# Patient Record
Sex: MALE | Race: WHITE | NOT HISPANIC OR LATINO | Employment: UNEMPLOYED | URBAN - METROPOLITAN AREA
[De-identification: names, ages, dates, MRNs, and addresses within clinical notes are randomized per-mention and may not be internally consistent; named-entity substitution may affect disease eponyms.]

---

## 2017-03-20 ENCOUNTER — HOSPITAL ENCOUNTER (EMERGENCY)
Facility: HOSPITAL | Age: 47
Discharge: HOME | End: 2017-03-20
Attending: EMERGENCY MEDICINE
Payer: MEDICAID

## 2017-03-20 VITALS
HEART RATE: 68 BPM | BODY MASS INDEX: 23.49 KG/M2 | DIASTOLIC BLOOD PRESSURE: 70 MMHG | TEMPERATURE: 98 F | HEIGHT: 68 IN | SYSTOLIC BLOOD PRESSURE: 121 MMHG | OXYGEN SATURATION: 98 % | WEIGHT: 155 LBS | RESPIRATION RATE: 18 BRPM

## 2017-03-20 DIAGNOSIS — R07.81 RIB PAIN ON LEFT SIDE: ICD-10-CM

## 2017-03-20 DIAGNOSIS — S40.012A CONTUSION OF LEFT SHOULDER, INITIAL ENCOUNTER: Primary | ICD-10-CM

## 2017-03-20 DIAGNOSIS — M50.30 BULGING OF CERVICAL INTERVERTEBRAL DISC: ICD-10-CM

## 2017-03-20 PROCEDURE — 99283 EMERGENCY DEPT VISIT LOW MDM: CPT

## 2017-03-20 PROCEDURE — 25000003 PHARM REV CODE 250: Performed by: EMERGENCY MEDICINE

## 2017-03-20 RX ORDER — NAPROXEN 500 MG/1
500 TABLET ORAL 2 TIMES DAILY WITH MEALS
Qty: 10 TABLET | Refills: 0 | Status: SHIPPED | OUTPATIENT
Start: 2017-03-20 | End: 2020-11-11

## 2017-03-20 RX ORDER — ACETAMINOPHEN 500 MG
1000 TABLET ORAL
Status: COMPLETED | OUTPATIENT
Start: 2017-03-20 | End: 2017-03-20

## 2017-03-20 RX ADMIN — ACETAMINOPHEN 1000 MG: 500 TABLET ORAL at 11:03

## 2017-03-20 NOTE — ED AVS SNAPSHOT
[facility]  [address] [facility] [address]               Jose Arriola   3/20/2017 10:07 PM   ED    Description:  Male : 1970   Department:  [facility]            Your Care was Coordinated By:     Provider Role From To    Lee Childs MD Attending Provider 17 1740 --      Reason for Visit     Fall           Diagnoses this Visit        Comments    Contusion of left shoulder, initial encounter    -  Primary     Rib pain on left side         Bulging of cervical intervertebral disc           ED Disposition     None           To Do List            These Medications        Disp Refills Start End    naproxen (NAPROSYN) 500 MG tablet 10 tablet 0 3/20/2017     Take 1 tablet (500 mg total) by mouth 2 (two) times daily with meals. - Oral      [facility]     [facility] On Call Nurse Care Line -  Assistance  Registered nurses in the [facility] provide clinical advisement, health education, appointment booking, and other advisory services.  Call for this free service at [phone].             Medications           Message regarding Medications     Verify the changes and/or additions to your medication regime listed below are the same as discussed with your clinician today.  If any of these changes or additions are incorrect, please notify your healthcare provider.        START taking these NEW medications        Refills    naproxen (NAPROSYN) 500 MG tablet 0    Sig: Take 1 tablet (500 mg total) by mouth 2 (two) times daily with meals.    Class: Print    Route: Oral      These medications were administered today        Dose Freq    acetaminophen tablet 1,000 mg 1,000 mg ED 1 Time    Sig: Take 2 tablets (1,000 mg total) by mouth ED 1 Time.    Class: Normal    Route: Oral           Verify that the below list of medications is an accurate representation of the medications you are currently taking.  If none  "reported, the list may be blank. If incorrect, please contact your healthcare provider. Carry this list with you in case of emergency.           Current Medications     acetaminophen tablet 1,000 mg Take 2 tablets (1,000 mg total) by mouth ED 1 Time.    naproxen (NAPROSYN) 500 MG tablet Take 1 tablet (500 mg total) by mouth 2 (two) times daily with meals.           Clinical Reference Information           Your Vitals Were     BP Pulse Temp Resp Height Weight    126/77 (BP Location: Right arm, Patient Position: Sitting) 75 98.4 °F (36.9 °C) (Oral) 18 5' 8" (1.727 m) 70.3 kg (155 lb)    SpO2 BMI             96% 23.57 kg/m2         Allergies as of 3/20/2017     No Known Allergies      Immunizations Administered on Date of Encounter - 3/20/2017     None      ED Micro, Lab, POCT     None      ED Imaging Orders     Start Ordered       Status Ordering Provider    03/20/17 2217 03/20/17 2217  X-Ray Shoulder Trauma 3 view Left  1 time imaging      Final result     03/20/17 2132 03/20/17 2132  X-Ray Chest PA And Lateral  1 time imaging      Final result     03/20/17 2132 03/20/17 2132  X-Ray Neck Soft Tissue  1 time imaging      Final result         Discharge Instructions         Bruises (Contusions)    A contusion is a bruise. A bruise happens when a blow to your body doesn't break the skin but does break blood vessels beneath the skin. Blood leaking from the broken vessels causes redness and swelling. As it heals, your bruise is likely to turn colors like purple, green, and yellow. This is normal. The bruise should fade in 2 or 3 weeks.  Factors that make you more likely to bruise  Almost everyone bruises now and then. Certain people do bruise more easily than others. You're more prone to bruising as you get older. That's because blood vessels become more fragile with age. You're also more likely to bruise if you have a clotting disorder such as hemophilia or take medications that reduce clotting, including aspirin.  When " to go to the emergency room (ER)  Bruises almost always heal on their own without special treatment. But for some people, a bad bruise can be serious. Seek medical care if you:  · Have a clotting disorder such as hemophilia.  · Have cirrhosis or other serious liver disease.  · Take blood-thinning medications such as warfarin (Coumadin).  What to expect in the ER  A doctor will examine your bruise and ask about any health conditions you have. In some cases, you may have a test to check how well your blood clots. Other treatment will depend on your needs.  Follow-up care  Sometimes a bruise gets worse instead of better. It may become larger and more swollen. This can occur when your body walls off a small pool of blood under the skin (hematoma). In very rare cases, your doctor may need to drain excess blood from the area.  Tip:  Apply an ice pack or bag of frozen peas to a bruise (keep a thin cloth between the cold source and your skin). This can help reduce redness and swelling.   Date Last Reviewed: 11/30/2014  © 4441-6722 [employer]. [address] [place] [address]. All rights reserved. This information is not intended as a substitute for professional medical care. Always follow your healthcare professional's instructions.          [facility] Sign-Up     Activating your [facility] account is as easy as 1-2-3!     1) Visit [email or web address], select Sign Up Now, enter this activation code and your date of birth, then select Next.  [email or web address]  Expires: 5/4/2017 11:03 PM      2) Create a username and password to use when you visit [facility] in the future and select a security question in case you lose your password and select Next.    3) Enter your e-mail address and click Sign Up!    Additional Information  If you have questions, please e-mail [email or web address] or call [phone] to talk to our [facility] staff. Remember, [facility] is NOT to be used for urgent needs. For medical  emergencies, dial 911.         Smoking Cessation     If you would like to quit smoking:   You may be eligible for free services if you are a [place] resident and started smoking cigarettes before September 1, 1988.  Call the Smoking Cessation Trust (SCT) toll free at (846) 160-9579 or (028) 456-7551.   Call 1-800-QUIT-NOW if you do not meet the above criteria.             [facility] complies with applicable Federal civil rights laws and does not discriminate on the basis of race, color, national origin, age, disability, or sex.        Language Assistance Services     ATTENTION: Language assistance services are available, free of charge. Please call [phone].      ATENCIÓN: Si habla español, tiene a peralta disposición servicios gratuitos de asistencia lingüística. Llame al [phone].     CHÚ Ý: N?u b?n nói Ti?ng Vi?t, có các d?ch v? h? tr? ngôn ng? mi?n phí dành cho b?n. G?i s? [phone].

## 2017-03-21 NOTE — ED NOTES
Started cursing after I gave tylenol refused to take stating will not help and just had taken neurotin. Cursing loudly at this time and walked out of Ed

## 2017-03-21 NOTE — DISCHARGE INSTRUCTIONS
Bruises (Contusions)    A contusion is a bruise. A bruise happens when a blow to your body doesn't break the skin but does break blood vessels beneath the skin. Blood leaking from the broken vessels causes redness and swelling. As it heals, your bruise is likely to turn colors like purple, green, and yellow. This is normal. The bruise should fade in 2 or 3 weeks.  Factors that make you more likely to bruise  Almost everyone bruises now and then. Certain people do bruise more easily than others. You're more prone to bruising as you get older. That's because blood vessels become more fragile with age. You're also more likely to bruise if you have a clotting disorder such as hemophilia or take medications that reduce clotting, including aspirin.  When to go to the emergency room (ER)  Bruises almost always heal on their own without special treatment. But for some people, a bad bruise can be serious. Seek medical care if you:  · Have a clotting disorder such as hemophilia.  · Have cirrhosis or other serious liver disease.  · Take blood-thinning medications such as warfarin (Coumadin).  What to expect in the ER  A doctor will examine your bruise and ask about any health conditions you have. In some cases, you may have a test to check how well your blood clots. Other treatment will depend on your needs.  Follow-up care  Sometimes a bruise gets worse instead of better. It may become larger and more swollen. This can occur when your body walls off a small pool of blood under the skin (hematoma). In very rare cases, your doctor may need to drain excess blood from the area.  Tip:  Apply an ice pack or bag of frozen peas to a bruise (keep a thin cloth between the cold source and your skin). This can help reduce redness and swelling.   Date Last Reviewed: 11/30/2014  © 7855-3888 [employer]. [address] [place] [address], [place] [address]. All rights reserved. This information is not intended as a substitute for  professional medical care. Always follow your healthcare professional's instructions.

## 2017-03-21 NOTE — ED PROVIDER NOTES
SCRIBE #1 NOTE: I, Nick Becerra, am scribing for, and in the presence of, Lee Childs MD. I have scribed the entire note.      History      Chief Complaint   Patient presents with    Fall     pt states he had a fall and it talisha his neck where he has bulging disc       Review of patient's allergies indicates:  No Known Allergies     HPI   HPI    3/20/2017, 10:33 PM   History obtained from the patient      History of Present Illness: Jose Arriola is a 46 y.o. male patient who presents to the Emergency Department for left shoulder pain which onset suddenly 3 hours PTA after jumping a fence and falling. Symptoms are constant and moderate in severity. No mitigating or exacerbating factors reported. Associated sxs include left rib cage pain and left elbow pain. Patient denies any LOC, numbness, weakness, n/v/d, abdominal pain, HA, lightheadedness, dizziness, gait problem, and all other sxs at this time. No further complaints or concerns at this time.         Arrival mode: Personal vehicle     PCP: Primary Doctor No       Past Medical History:  Past medical history reviewed not relevant      Past Surgical History:  Past surgical history reviewed not relevant      Family History:  Family history reviewed not relevant      Social History:  Social History    Social History Main Topics    Social History Main Topics    Smoking status: Unknown if ever smoked    Smokeless tobacco: Unknown if ever used    Alcohol Use: Unknown drinking history    Drug Use: Unknown if ever used    Sexual Activity: Unknown       ROS   Review of Systems   Constitutional: Negative for fever.        - LOC   HENT: Negative for sore throat.    Respiratory: Negative for shortness of breath.    Cardiovascular: Negative for chest pain.   Gastrointestinal: Negative for abdominal pain, diarrhea, nausea and vomiting.   Genitourinary: Negative for dysuria.   Musculoskeletal: Positive for arthralgias (left shoulder and elbow). Negative for  "back pain and gait problem.        + left rib cage pain   Skin: Negative for rash.   Neurological: Negative for dizziness, weakness, light-headedness, numbness and headaches.   Hematological: Does not bruise/bleed easily.       Physical Exam    Initial Vitals   BP Pulse Resp Temp SpO2   03/20/17 2130 03/20/17 2130 03/20/17 2130 03/20/17 2130 03/20/17 2130   126/77 75 18 98.4 °F (36.9 °C) 96 %      Physical Exam  Nursing Notes and Vital Signs Reviewed.  Constitutional: Patient is in no acute distress. Awake and alert. Well-developed and well-nourished.  Head: Atraumatic. Normocephalic.  Eyes: PERRL. EOM intact. Conjunctivae are not pale. No scleral icterus.  ENT: Mucous membranes are moist. Oropharynx is clear and symmetric.    Neck: Supple. Full ROM. No lymphadenopathy.  Cardiovascular: Regular rate. Regular rhythm. No murmurs, rubs, or gallops. Distal pulses are 2+ and symmetric.  Pulmonary/Chest: No respiratory distress. Clear to auscultation bilaterally. No wheezing, rales, or rhonchi. Mild left sided chest wall tenderness. No crepitus.  Abdominal: Soft and non-distended.  There is no tenderness.  No rebound, guarding, or rigidity. Good bowel sounds.  Musculoskeletal: Moves all extremities. No obvious deformities. No edema. No calf tenderness.  LUE: has no evident deformity. negative for swelling. Positive for tenderness to the left shoulder. ROM is decreased secondary to pain. Cap refill distally is <2 seconds. Radial pulses are equal and 2+ bilaterally. No motor deficit. No distal sensory deficit.  Skin: Warm and dry.  Neurological:  Alert, awake, and appropriate.  Normal speech.  No acute focal neurological deficits are appreciated.  Psychiatric: Normal affect. Good eye contact. Appropriate in content.    ED Course    Procedures  ED Vital Signs:  Vitals:    03/20/17 2130   BP: 126/77   Pulse: 75   Resp: 18   Temp: 98.4 °F (36.9 °C)   TempSrc: Oral   SpO2: 96%   Weight: 70.3 kg (155 lb)   Height: 5' 8" (1.727 " m)         Imaging Results:  Imaging Results         X-Ray Shoulder Trauma 3 view Left (Final result) Result time:  03/20/17 22:32:02    Final result by Elías Plascencia MD (03/20/17 22:32:02)    Impression:         Unremarkable exam.      Electronically signed by: ELÍAS PLASCENCIA MD  Date:     03/20/17  Time:    22:32     Narrative:    Exam: Left shoulder x-ray, 3 views.    History: Pain in left shoulder.    Findings: AP views were obtained in internal and external rotation. In addition, a transscapular view was acquired. The AC and glenohumeral joints are unremarkable. No fracture, periosteal reaction, or osseous destruction identified.            X-Ray Chest PA And Lateral (Final result) Result time:  03/20/17 21:52:13    Final result by Tone Griffith MD (03/20/17 21:52:13)    Impression:           1.  Negative for acute process involving the chest.  2.  Incidental findings as noted above.      Electronically signed by: TONE GRIFFITH MD  Date:     03/20/17  Time:    21:52     Narrative:    PA and Lateral Chest x-ray    Clinical Indication: R07.81 Pleurodynia.       Findings:    No comparison studies are available.      The lungs are clear. The cardiac silhouette size is normal. The trachea is midline and the mediastinal width is normal. Negative for focal infiltrate, effusion or pneumothorax. Pulmonary vasculature is normal. Negative for osseous abnormalities.   There are fat pads adjacent to one or both cardiophrenic angles.  There are degenerative changes and possible postoperative or post hepatic changes to the right acromioclavicular joint.  Marginal spondylosis.            X-Ray Neck Soft Tissue (Final result) Result time:  03/20/17 21:53:47    Final result by Tone Griffith MD (03/20/17 21:53:47)    Impression:      1.  Mild degenerative change of the cervical spine.  2.  Airways are widely patent.      Electronically signed by: TONE GRIFFITH MD  Date:     03/20/17  Time:    21:53     Narrative:    Soft  tissue neck x-ray 2 views    Clinical indication: M50.20 Other cervical disc displacement, unspecified cervical region    Findings: Airways are patent.  Epiglottis is normal.  Chronic soft tissues are without focal abnormality.    Mild degenerative changes of the mid and lower cervical spine.                      The Emergency Provider reviewed the vital signs and test results, which are outlined above.    ED Discussion     11:04 PM: Reassessed pt at this time. Pt is awake, alert, and in no distress. Discussed with pt all pertinent ED information and results. Discussed pt dx and plan of tx. Gave pt all f/u and return to the ED instructions. All questions and concerns were addressed at this time. Pt expresses understanding of information and instructions, and is comfortable with plan to discharge. Pt is stable for discharge.    Trauma precautions were discussed with patient and/or family/caretaker; I do not specifically detect any abdominal, thoracic, CNS, orthopedic, or other emergent or life threatening condition and that patient is safe to be discharged.  It was also discussed that despite an unrevealing examination and negative radiographic examination for serious or life threatening injury, these conditions may still exist.  As such, patient should return to ED immediately should they experience, severe or worsening pain, shortness of breath, abdominal pain, headache, vomiting, or any other concern.  It was also discussed that not infrequently, injuries may not be diagnosed during the initial ED visit (such as fractures) and that if the patient discovers a new area of concern, a new area of injury that was not evaluated in the ED, they should return for evaluation as they may have an injury that requires treatment.      ED Medication(s):  Medications   acetaminophen tablet 1,000 mg (not administered)       New Prescriptions    NAPROXEN (NAPROSYN) 500 MG TABLET    Take 1 tablet (500 mg total) by mouth 2 (two)  times daily with meals.             Medical Decision Making    Medical Decision Making:   Clinical Tests:   Radiological Study: Ordered and Reviewed           Scribe Attestation:   Scribe #1: I performed the above scribed service and the documentation accurately describes the services I performed. I attest to the accuracy of the note.    Attending:   Physician Attestation Statement for Scribe #1: I, Lee Childs MD, personally performed the services described in this documentation, as scribed by Nick Becerra, in my presence, and it is both accurate and complete.          Clinical Impression       ICD-10-CM ICD-9-CM   1. Contusion of left shoulder, initial encounter S40.012A 923.00   2. Rib pain on left side R07.81 786.50   3. Bulging of cervical intervertebral disc M50.20 722.0       Disposition:   Disposition: Discharged  Condition: Stable         Lee Childs MD  03/21/17 0048

## 2017-03-27 ENCOUNTER — HOSPITAL ENCOUNTER (EMERGENCY)
Facility: HOSPITAL | Age: 47
Discharge: HOME | End: 2017-03-27
Attending: SPECIALIST
Payer: MEDICAID

## 2017-03-27 VITALS
TEMPERATURE: 98 F | HEIGHT: 68 IN | RESPIRATION RATE: 18 BRPM | HEART RATE: 66 BPM | BODY MASS INDEX: 24.25 KG/M2 | DIASTOLIC BLOOD PRESSURE: 67 MMHG | OXYGEN SATURATION: 98 % | SYSTOLIC BLOOD PRESSURE: 115 MMHG | WEIGHT: 160 LBS

## 2017-03-27 DIAGNOSIS — Z76.0 MEDICATION REFILL: Primary | ICD-10-CM

## 2017-03-27 PROCEDURE — 99281 EMR DPT VST MAYX REQ PHY/QHP: CPT

## 2017-03-27 RX ORDER — GABAPENTIN 400 MG/1
400 CAPSULE ORAL 3 TIMES DAILY
Qty: 90 CAPSULE | Refills: 0 | Status: SHIPPED | OUTPATIENT
Start: 2017-03-27 | End: 2018-03-27

## 2017-03-27 RX ORDER — PREGABALIN 150 MG/1
150 CAPSULE ORAL 3 TIMES DAILY
Qty: 90 CAPSULE | Refills: 0 | Status: SHIPPED | OUTPATIENT
Start: 2017-03-27 | End: 2021-05-19

## 2017-03-27 NOTE — ED AVS SNAPSHOT
[facility]  [id number] [place] [id number]               Jose Arriola   3/27/2017  6:21 PM   ED    Description:  Male : 1970   Department:  [facility]           Your Care was Coordinated By:     Provider Role From To    Melani Pacheco MD Attending Provider 17 0233 --      Reason for Visit     Medication Refill           Diagnoses this Visit        Comments    Medication refill    -  Primary       ED Disposition     None           To Do List           Follow-up Information     Please follow up.    Why:  PCP call [phone]        Follow up with [facility].    Specialty:  Emergency Medicine    Why:  If symptoms worsen    Contact information:    [address] [place] [id number] [phone]       These Medications        Disp Refills Start End    gabapentin (NEURONTIN) 400 MG capsule 90 capsule 0 3/27/2017 3/27/2018    Take 1 capsule (400 mg total) by mouth 3 (three) times daily. - Oral    pregabalin (LYRICA) 150 MG capsule 90 capsule 0 3/27/2017 2017    Take 1 capsule (150 mg total) by mouth 3 (three) times daily. - Oral      [facility]     [facility] Care [facility] -  Assistance  Registered nurses in the [facility] provide clinical advisement, health education, appointment booking, and other advisory services.  Call for this free service at [phone].             Medications           Message regarding Medications     Verify the changes and/or additions to your medication regime listed below are the same as discussed with your clinician today.  If any of these changes or additions are incorrect, please notify your healthcare provider.        START taking these NEW medications        Refills    gabapentin (NEURONTIN) 400 MG capsule 0    Sig: Take 1 capsule (400 mg total) by mouth 3 (three) times daily.    Class: Print    Route: Oral     "pregabalin (LYRICA) 150 MG capsule 0    Sig: Take 1 capsule (150 mg total) by mouth 3 (three) times daily.    Class: Print    Route: Oral           Verify that the below list of medications is an accurate representation of the medications you are currently taking.  If none reported, the list may be blank. If incorrect, please contact your healthcare provider. Carry this list with you in case of emergency.           Current Medications     gabapentin (NEURONTIN) 400 MG capsule Take 1 capsule (400 mg total) by mouth 3 (three) times daily.    naproxen (NAPROSYN) 500 MG tablet Take 1 tablet (500 mg total) by mouth 2 (two) times daily with meals.    pregabalin (LYRICA) 150 MG capsule Take 1 capsule (150 mg total) by mouth 3 (three) times daily.           Clinical Reference Information           Your Vitals Were     BP Pulse Temp Resp Height Weight    115/67 (BP Location: Right arm, Patient Position: Sitting) 66 98 °F (36.7 °C) (Oral) 18 5' 8" (1.727 m) 72.6 kg (160 lb)    SpO2 BMI             98% 24.33 kg/m2         Allergies as of 3/27/2017     No Known Allergies      Immunizations Administered on Date of Encounter - 3/27/2017     None      ED Micro, Lab, POCT     None      ED Imaging Orders     None      Discharge References/Attachments     GABAPENTIN CAPSULES OR TABLETS (ENGLISH)    PREGABALIN CAPSULES (ENGLISH)      [facility] Sign-Up     Activating your [facility] account is as easy as 1-2-3!     1) Visit [email or web address], select Sign Up Now, enter this activation code and your date of birth, then select Next.  [email or web address]  Expires: 5/4/2017 11:03 PM      2) Create a username and password to use when you visit [facility] in the future and select a security question in case you lose your password and select Next.    3) Enter your e-mail address and click Sign Up!    Additional Information  If you have questions, please e-mail [email or web address] or call [phone] to talk to our [facility] staff. Remember, " [facility] is NOT to be used for urgent needs. For medical emergencies, dial 911.         Smoking Cessation     If you would like to quit smoking:   You may be eligible for free services if you are a [place] resident and started smoking cigarettes before September 1, 1988.  Call the Smoking Cessation Trust (SCT) toll free at (420) 871-0754 or (721) 374-0292.   Call 1-800-QUIT-NOW if you do not meet the above criteria.             [facility] complies with applicable Federal civil rights laws and does not discriminate on the basis of race, color, national origin, age, disability, or sex.        Language Assistance Services     ATTENTION: Language assistance services are available, free of charge. Please call [phone].      ATENCIÓN: Si habla osmarañol, tiene a peralta disposición servicios gratuitos de asistencia lingüística. Llame al [phone].     CHÚ Ý: N?u b?n nói Ti?ng Vi?t, có các d?ch v? h? tr? ngôn ng? mi?n phí dành cho b?n. G?i s? [phone].

## 2017-03-27 NOTE — ED PROVIDER NOTES
SCRIBE #1 NOTE: I, Craig Singh, am scribing for, and in the presence of, Melani Pacheco MD. I have scribed the entire note.      History      Chief Complaint   Patient presents with    Medication Refill     gabapentin 400 mg TID and lyrica 150mg TID       Review of patient's allergies indicates:  No Known Allergies     HPI   HPI    3/27/2017, 5:59 PM   History obtained from the patient      History of Present Illness: Jose Arriola is a 46 y.o. male patient who presents to the Emergency Department for medication refill. Pt states he needs a refill on gabapentin 400 mg TID and lyrica 150 mg TID. Pt reports running out of his mediations one month ago and states he has been trying to make an appointment with his doctor for 2 months now. Pt reports having a bulging disk in his neck but pt is denyign any sxs at this time. Pt denies any fever, chills, N/V/D, weakness/numbness, CP, abd pain, SOB, neck pain, back pain, gait problem, neck stiffness and all other sxs at this time. Pt has no further complaints or concerns at this time.      Arrival mode: Personal vehicle     PCP: Primary Doctor No     Past Medical History:  Past medical history reviewed not relevant      Past Surgical History:  Past surgical history reviewed not relevant      Family History:  Family history reviewed not relevant      Social History:  Social History    Social History Main Topics    Social History Main Topics    Smoking status: Unknown if ever smoked    Smokeless tobacco: Unknown if ever used    Alcohol Use: Unknown drinking history    Drug Use: Unknown if ever used    Sexual Activity: Unknown       ROS   Review of Systems   Constitutional: Negative for chills and fever.   HENT: Negative for congestion and sore throat.    Respiratory: Negative for chest tightness and shortness of breath.    Cardiovascular: Negative for chest pain.   Gastrointestinal: Negative for abdominal pain, nausea and vomiting.   Musculoskeletal: Negative  "for back pain and neck pain.   Skin: Negative for rash.   Neurological: Negative for dizziness, numbness and headaches.   Psychiatric/Behavioral: Negative for agitation and confusion.   All other systems reviewed and are negative.      Physical Exam    Initial Vitals   BP Pulse Resp Temp SpO2   03/27/17 1758 03/27/17 1758 03/27/17 1758 03/27/17 1758 03/27/17 1758   115/67 66 18 98 °F (36.7 °C) 98 %      Physical Exam  Nursing Notes and Vital Signs Reviewed.  Constitutional: Patient is in no apparent distress. Awake and alert. Well-developed and well-nourished.  Head: Atraumatic. Normocephalic.  Eyes: PERRL. EOM intact. Conjunctivae are not pale. No scleral icterus.  ENT: Mucous membranes are moist. Oropharynx is clear and symmetric.    Neck: Supple. Full ROM. No lymphadenopathy.  Cardiovascular: Regular rate. Regular rhythm. No murmurs, rubs, or gallops. Distal pulses are 2+ and symmetric.  Pulmonary/Chest: No respiratory distress. Clear to auscultation bilaterally. No wheezing, rales, or rhonchi.  Abdominal: Soft and non-distended.  There is no tenderness.  No rebound, guarding, or rigidity. Good bowel sounds.  Musculoskeletal: Moves all extremities. No obvious deformities. No edema. No calf tenderness.  Skin: Warm and dry.  Neurological:  Alert, awake, and appropriate.  Normal speech.  No acute focal neurological deficits are appreciated.  Psychiatric: Normal affect. Good eye contact. Appropriate in content.    ED Course    Procedures  ED Vital Signs:  Vitals:    03/27/17 1758   BP: 115/67   Pulse: 66   Resp: 18   Temp: 98 °F (36.7 °C)   TempSrc: Oral   SpO2: 98%   Weight: 72.6 kg (160 lb)   Height: 5' 8" (1.727 m)       Abnormal Lab Results:  Labs Reviewed - No data to display               The Emergency Provider reviewed the vital signs and test results, which are outlined above.    ED Discussion     6:27 PM: Pt is in NAD. Pt is awake, alert, and oriented. Discussed with pt all pertinent ED information and " results. Discussed pt dx and plan of tx. Gave pt all f/u and return to the ED instructions. All questions and concerns were addressed at this time. Pt expresses understanding of information and instructions, and is comfortable with plan to discharge. Pt is stable for discharge.      ED Medication(s):  Medications - No data to display    Discharge Medication List as of 3/27/2017  6:29 PM      START taking these medications    Details   gabapentin (NEURONTIN) 400 MG capsule Take 1 capsule (400 mg total) by mouth 3 (three) times daily., Starting 3/27/2017, Until Tue 3/27/18, Print      pregabalin (LYRICA) 150 MG capsule Take 1 capsule (150 mg total) by mouth 3 (three) times daily., Starting 3/27/2017, Until Mon 9/25/17, Print             Follow-up Information     Please follow up.    Why:  PCP call [phone]        Follow up with [facility].    Specialty:  Emergency Medicine    Why:  If symptoms worsen    Contact information:    [address]  [facility] [address] [phone]            Medical Decision Making              Scribe Attestation:   Scribe #1: I performed the above scribed service and the documentation accurately describes the services I performed. I attest to the accuracy of the note.    Attending:   Physician Attestation Statement for Scribe #1: I, Melani Pacheco MD, personally performed the services described in this documentation, as scribed by Craig Singh, in my presence, and it is both accurate and complete.          Clinical Impression       ICD-10-CM ICD-9-CM   1. Medication refill Z76.0 V68.1       Disposition:   Disposition: Discharged  Condition: Stable         Melani Pacheco MD  03/28/17 1921

## 2020-11-12 PROBLEM — M54.42 CHRONIC BILATERAL LOW BACK PAIN WITH BILATERAL SCIATICA: Status: ACTIVE | Noted: 2020-11-12

## 2020-11-12 PROBLEM — M15.9 PRIMARY OSTEOARTHRITIS INVOLVING MULTIPLE JOINTS: Status: ACTIVE | Noted: 2020-11-12

## 2020-11-12 PROBLEM — M54.2 CERVICALGIA: Status: ACTIVE | Noted: 2020-11-12

## 2020-11-12 PROBLEM — M25.551 PAIN OF BOTH HIP JOINTS: Status: ACTIVE | Noted: 2020-11-12

## 2020-11-12 PROBLEM — L40.9 PSORIASIS: Status: ACTIVE | Noted: 2020-11-12

## 2020-11-12 PROBLEM — M25.552 PAIN OF BOTH HIP JOINTS: Status: ACTIVE | Noted: 2020-11-12

## 2020-11-12 PROBLEM — G89.29 CHRONIC BILATERAL LOW BACK PAIN WITH BILATERAL SCIATICA: Status: ACTIVE | Noted: 2020-11-12

## 2020-11-12 PROBLEM — M54.41 CHRONIC BILATERAL LOW BACK PAIN WITH BILATERAL SCIATICA: Status: ACTIVE | Noted: 2020-11-12

## 2021-05-12 ENCOUNTER — PATIENT MESSAGE (OUTPATIENT)
Dept: RESEARCH | Facility: HOSPITAL | Age: 51
End: 2021-05-12